# Patient Record
Sex: MALE | Race: OTHER | NOT HISPANIC OR LATINO | URBAN - METROPOLITAN AREA
[De-identification: names, ages, dates, MRNs, and addresses within clinical notes are randomized per-mention and may not be internally consistent; named-entity substitution may affect disease eponyms.]

---

## 2020-07-29 ENCOUNTER — EMERGENCY (EMERGENCY)
Facility: HOSPITAL | Age: 35
LOS: 1 days | Discharge: HOME | End: 2020-07-29
Attending: STUDENT IN AN ORGANIZED HEALTH CARE EDUCATION/TRAINING PROGRAM
Payer: SELF-PAY

## 2020-07-29 VITALS
SYSTOLIC BLOOD PRESSURE: 127 MMHG | HEIGHT: 66.5 IN | DIASTOLIC BLOOD PRESSURE: 79 MMHG | RESPIRATION RATE: 16 BRPM | TEMPERATURE: 98 F | WEIGHT: 149.91 LBS | HEART RATE: 84 BPM | OXYGEN SATURATION: 99 %

## 2020-07-29 PROCEDURE — 12002 RPR S/N/AX/GEN/TRNK2.6-7.5CM: CPT

## 2020-07-29 PROCEDURE — 99283 EMERGENCY DEPT VISIT LOW MDM: CPT | Mod: 25

## 2020-07-29 PROCEDURE — 90715 TDAP VACCINE 7 YRS/> IM: CPT

## 2020-07-29 PROCEDURE — 90471 IMMUNIZATION ADMIN: CPT

## 2020-07-29 RX ORDER — CEPHALEXIN 500 MG
500 CAPSULE ORAL ONCE
Refills: 0 | Status: COMPLETED | OUTPATIENT
Start: 2020-07-29 | End: 2020-07-29

## 2020-07-29 RX ORDER — TETANUS TOXOID, REDUCED DIPHTHERIA TOXOID AND ACELLULAR PERTUSSIS VACCINE, ADSORBED 5; 2.5; 8; 8; 2.5 [IU]/.5ML; [IU]/.5ML; UG/.5ML; UG/.5ML; UG/.5ML
0.5 SUSPENSION INTRAMUSCULAR ONCE
Refills: 0 | Status: COMPLETED | OUTPATIENT
Start: 2020-07-29 | End: 2020-07-29

## 2020-07-29 RX ORDER — CEPHALEXIN 500 MG
1 CAPSULE ORAL
Qty: 15 | Refills: 0
Start: 2020-07-29 | End: 2020-08-02

## 2020-07-29 RX ADMIN — Medication 500 MILLIGRAM(S): at 10:40

## 2020-07-29 RX ADMIN — TETANUS TOXOID, REDUCED DIPHTHERIA TOXOID AND ACELLULAR PERTUSSIS VACCINE, ADSORBED 0.5 MILLILITER(S): 5; 2.5; 8; 8; 2.5 SUSPENSION INTRAMUSCULAR at 09:57

## 2020-07-29 NOTE — ED ADULT NURSE NOTE - NSIMPLEMENTINTERV_GEN_ALL_ED
Implemented All Fall Risk Interventions:  [place] to call system. Call bell, personal items and telephone within reach. Instruct patient to call for assistance. Room bathroom lighting operational. Non-slip footwear when patient is off stretcher. Physically safe environment: no spills, clutter or unnecessary equipment. Stretcher in lowest position, wheels locked, appropriate side rails in place. Provide visual cue, wrist band, yellow gown, etc. Monitor gait and stability. Monitor for mental status changes and reorient to person, place, and time. Review medications for side effects contributing to fall risk. Reinforce activity limits and safety measures with patient and family.

## 2020-07-29 NOTE — ED PROVIDER NOTE - PATIENT PORTAL LINK FT
You can access the FollowMyHealth Patient Portal offered by [facility] by registering at the following website: [email or web address]. By joining [facility]’s FollowMyHealth portal, you will also be able to view your health information using other applications (apps) compatible with our system.

## 2020-07-29 NOTE — ED ADULT NURSE NOTE - CADM POA URETHRAL CATHETER
No Oxybutynin Pregnancy And Lactation Text: This medication is Pregnancy Category B and is considered safe during pregnancy. It is unknown if it is excreted in breast milk.

## 2020-07-29 NOTE — ED PROVIDER NOTE - ATTENDING CONTRIBUTION TO CARE
Evans DO: I have personally performed a face to face medical and diagnostic evaluation of the patient. I have discussed with and reviewed the ACP's note and agree with the History, ROS, Physical Exam and MDM unless otherwise indicated. A brief summary of my personal evaluation and impression can be found below.    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 No other injuries. Unknown last tetanus. Will repair lac, update tetanus, dc with outpt f/u

## 2020-07-29 NOTE — ED PROVIDER NOTE - NSFOLLOWUPINSTRUCTIONS_ED_ALL_ED_FT
1- Keep area dry for the next 24-48 hours, after change bandage daily and wash with soap and water  2- Return to ER in 10 days for suture removal  3- Keflex 500 mg every 8 hours for 5 days to prevent infection  4- If you have any signs of infection such as worsening pain, swelling, redness, pus, fevers, or any other concerns come back to the ER immediately

## 2020-07-29 NOTE — ED PROVIDER NOTE - PROGRESS NOTE DETAILS
Evans DO: Pt assessed, left forearm wound from kitchen knife during work, was washed, occurred yesterday round 8-9pm, approx 12 hours out, appears to be healing but gaping and after thorough cleansing, some bleeding noted, sutured by ELVIN Weir, pt aware of need for suture removal in 10 days, nature of delayed wound healing and increased risk of infection and poor wound healing, as well as to watch for signs of infection including severe pain, swelling, redness or fevers or purulent drainage, pt endorsed understanding, requested 3 days off work, mandarin  used [id number]

## 2020-07-29 NOTE — ED PROVIDER NOTE - OBJECTIVE STATEMENT
35 y.o. male coming in with laceration to his left forearm with a clean knife yesterday while at work.  Pt was able to get bleeding under control with Chinese herbal powder and pressure dressing, did not come to the ED because he thought it was too late when he gt out of work.  No PMHx, unsure of last tetanus.  No numbness, no weakness in LUE.  No fevers, no chills, no rash.  No other complaints at this time.  [employer] ID #[id number]

## 2020-07-29 NOTE — ED PROCEDURE NOTE - ATTENDING CONTRIBUTION TO CARE
5 5-0 prolene sutures placed for 3cm volar forearm laceration with good approximation and hemostasis, wound care instructions provided.

## 2020-07-29 NOTE — ED ADULT NURSE NOTE - OBJECTIVE STATEMENT
35 y.o. male coming in with laceration to his left forearm with a clean knife yesterday while at work.  Pt was able to get bleeding under control with Chinese herbal powder and pressure dressing, did not come to the ED because he thought it was too late when he gt out of work.  No PMHx, unsure of last tetanus.  No numbness, no weakness in LUE.  No fevers, no chills, no rash.  No other complaints at this time.  [employer] ID #[id number]

## 2020-08-09 ENCOUNTER — EMERGENCY (EMERGENCY)
Facility: HOSPITAL | Age: 35
LOS: 1 days | Discharge: HOME | End: 2020-08-09
Attending: EMERGENCY MEDICINE
Payer: COMMERCIAL

## 2020-08-09 VITALS
RESPIRATION RATE: 18 BRPM | TEMPERATURE: 98 F | DIASTOLIC BLOOD PRESSURE: 74 MMHG | OXYGEN SATURATION: 99 % | SYSTOLIC BLOOD PRESSURE: 117 MMHG | HEART RATE: 76 BPM

## 2020-08-09 PROCEDURE — 99282 EMERGENCY DEPT VISIT SF MDM: CPT

## 2020-08-09 NOTE — ED PROVIDER NOTE - OBJECTIVE STATEMENT
History obtained with  (ID: [id number])  35 yr old Mandarin-speaking, right-handed M with no significant PMH or PSHx presents for removal of sutures from his L forearm. 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 He was given Cephalexin, and he completed the treatment course yesterday. He is not having any pain, numbness/tingling or other loss of sensation in his forearm, hand, or fingers. He has been resting the arm as instructed, but has been able to tolerate light activity. He has not noted any erythema, edema, or discharge from the suture site, and his sutures have remained clean, dry, and intact. History obtained with  (ID: [id number])  35 yr old Mandarin-speaking, right-handed M with no significant PMH or PSHx presents for removal of sutures from his L forearm.   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 He was given Cephalexin, and he completed the treatment course yesterday. He is not having any pain, numbness/tingling or other loss of sensation in his forearm, hand, or fingers. He has been resting the arm as instructed, but has been able to tolerate light activity. He has not noted any erythema, edema, or discharge from the suture site, and his sutures have remained clean, dry, and intact.

## 2020-08-09 NOTE — ED PROVIDER NOTE - CLINICAL SUMMARY MEDICAL DECISION MAKING FREE TEXT BOX
35 yr old M suture on L forearm on 7/29/20 has been on antibiotics, came in for suture/staple removal. No fever, chills, wound looks clean, dry, intact. Will remove staples and follow-up with PMD. ZR

## 2020-08-09 NOTE — ED ADULT NURSE NOTE - OBJECTIVE STATEMENT
35 year old A&Ox3 male presents to ED for suture/staple removal. Denies PMH. 5 sutures noted to left medial forearm, no redness, purulent drainage or pain noted. Denies CP, SOB, n/v/d, fevers, chills, abdominal pain, urinary symptoms, weakness, fatigue, numbness, tingling in upper and lower extremities, HA, blurry vision. VSS updated on plan of care.

## 2020-08-09 NOTE — ED PROVIDER NOTE - NS ED ROS FT
Review of Systems:  Constitutional: No fever, No weight loss, good appetite/po intake  Head: No headache   Eyes: No blurry vision, No diplopia  Neuro: No tremors, No muscle weakness   Cardiovascular: No chest pain, No palpitations  Respiratory: No increased respiratory effort, No SOB, No cough  GI: No nausea, No vomiting, No diarrhea  : No dysuria, No hematuria  Skin: +sutures in L forearm  MSK: No joint pain   All other systems negative except as per HPI

## 2020-08-09 NOTE — ED PROVIDER NOTE - NSFOLLOWUPINSTRUCTIONS_ED_ALL_ED_FT
No signs of emergency medical condition on today's workup.  Presumptive diagnosis made, but further evaluation may be required by your primary care doctor or specialist for a definitive diagnosis.    Please follow up with your primary care physician in 24-48 hours  Please come back if any of the following: Fever, chest pain, shortness of breath, numbness, tingling, pain or any major concern

## 2020-08-09 NOTE — ED PROVIDER NOTE - PHYSICAL EXAMINATION
PHYSICAL EXAM  GENERAL: NAD, lying comfortably in bed   HEAD:  Atraumatic, Normocephalic  EYES: EOMI b/l, PERRLA b/l, conjunctiva and sclera clear  NECK: Supple, No JVD, No LAD   CHEST/LUNG: Clear to auscultation bilaterally; No crackles or wheezes  HEART: Regular rate and rhythm; S1 and S2 present, No murmurs, rubs, or gallops  ABDOMEN: Nondistended. Soft, Nontender to palpation. Bowel sounds present  EXTREMITIES:  Sutures in place in L forearm, site is clean, dry, intact without noted erythema, edema, or purulence. Patient has Full ROM  NEURO: AAOx3, non-focal. Sensation intact to light touch in forearm, hand, fingers. Strength is 5/5 in left arm, 5/5  strength in L hand and able to form a fist   SKIN: No rashes or lesions

## 2020-08-09 NOTE — ED PROVIDER NOTE - PATIENT PORTAL LINK FT
You can access the FollowMyHealth Patient Portal offered by [facility] by registering at the following website: [email or web address]. By joining [email or web address]’s FollowMyHealth portal, you will also be able to view your health information using other applications (apps) compatible with our system.

## 2020-08-09 NOTE — ED ADULT NURSE NOTE - CHPI ED NUR SYMPTOMS NEG
no purulent drainage/no redness/no bleeding at site/no chills/no fever/no bleeding/no pain/no rectal pain

## 2022-09-12 NOTE — ED ADULT NURSE NOTE - DRUG PRE-SCREENING (DAST -1)
